# Patient Record
Sex: FEMALE | URBAN - METROPOLITAN AREA
[De-identification: names, ages, dates, MRNs, and addresses within clinical notes are randomized per-mention and may not be internally consistent; named-entity substitution may affect disease eponyms.]

---

## 2021-02-08 ENCOUNTER — EMERGENCY (EMERGENCY)
Facility: HOSPITAL | Age: 71
LOS: 1 days | Discharge: HOME | End: 2021-02-08
Attending: EMERGENCY MEDICINE
Payer: MEDICARE

## 2021-02-08 VITALS
SYSTOLIC BLOOD PRESSURE: 137 MMHG | RESPIRATION RATE: 20 BRPM | OXYGEN SATURATION: 99 % | WEIGHT: 231.93 LBS | DIASTOLIC BLOOD PRESSURE: 72 MMHG | HEIGHT: 67 IN | HEART RATE: 77 BPM | TEMPERATURE: 98 F

## 2021-02-08 VITALS
SYSTOLIC BLOOD PRESSURE: 137 MMHG | DIASTOLIC BLOOD PRESSURE: 82 MMHG | TEMPERATURE: 98 F | OXYGEN SATURATION: 97 % | RESPIRATION RATE: 19 BRPM | HEART RATE: 60 BPM

## 2021-02-08 DIAGNOSIS — I83.90 ASYMPTOMATIC VARICOSE VEINS OF UNSPECIFIED LOWER EXTREMITY: ICD-10-CM

## 2021-02-08 DIAGNOSIS — Z88.6 ALLERGY STATUS TO ANALGESIC AGENT: ICD-10-CM

## 2021-02-08 DIAGNOSIS — I10 ESSENTIAL (PRIMARY) HYPERTENSION: ICD-10-CM

## 2021-02-08 PROCEDURE — 99283 EMERGENCY DEPT VISIT LOW MDM: CPT

## 2021-02-08 NOTE — ED ADULT NURSE REASSESSMENT NOTE - NS ED NURSE REASSESS COMMENT FT1
pt verbalized feeling better , dressing and ace bandage applied as ordered , no bleeding noted pt to f/u with vascular for f/u care.

## 2021-02-08 NOTE — ED PROVIDER NOTE - PATIENT PORTAL LINK FT
You can access the FollowMyHealth Patient Portal offered by [facility] by registering at the following website: [email or web address]. By joining [employer]’s FollowMyHealth portal, you will also be able to view your health information using other applications (apps) compatible with our system.

## 2021-02-08 NOTE — ED PROVIDER NOTE - NSFOLLOWUPINSTRUCTIONS_ED_ALL_ED_FT
Keep ace wrapping in place for 24-48 hours.  Follow-up with your primary care doctor.  Also follow-up with vascular

## 2021-02-08 NOTE — ED PROVIDER NOTE - INTERNATIONAL TRAVEL
No
chowdary catheter successfully initiated draining pooja color urine, instructed pt on chowdary catheter care, pt demonstrated understanding via teach back/Alert and oriented to person, place and time

## 2021-02-08 NOTE — ED ADULT NURSE NOTE - OBJECTIVE STATEMENT
c/o bleeding L lower extremity near ankle. Patient has vernicose veins, was placing lotion and scratched herself, reports are started bleeding significantly, bleeding currently under control, dressing placed by EMS, denies pain, lightheadedness, and dizziness.

## 2021-02-08 NOTE — ED PROVIDER NOTE - OBJECTIVE STATEMENT
This patient is a 70 year old woman who presents to the ER c/o bleeding at her left foot.  Patient states that she was applying lotion to leg and a varicose vein began bleeding.  She denies pain.

## 2021-02-08 NOTE — ED PROVIDER NOTE - PROGRESS NOTE DETAILS
No bleeding while being observed.  When she was re-examined the area was palpated which resulted and oozing of blood at the site. Wound care done by me.  Surgicel applied and covered with gauze and ace wrap.

## 2021-02-08 NOTE — ED PROVIDER NOTE - CARE PROVIDER_API CALL
JOEL ALBRIGHT  Family Practice  [address] [place] [address]  Phone: [phone]  Fax: [phone]  Follow Up Time:    Marcos Salcido)  Surgery  [address], [address]  [place] [address]  Phone: [phone]  Fax: [phone]  Follow Up Time:

## 2021-02-08 NOTE — ED ADULT NURSE NOTE - NSFALLRSKASSESASSIST_ED_ALL_ED
Telephone Encounter by Zamzam Roque at 06/21/17 01:05 PM     Author:  Zamzam Roque Service:  (none) Author Type:  Patient      Filed:  06/21/17 01:06 PM Encounter Date:  6/21/2017 Status:  Signed     :  Zamzam Roque (Patient )            Patient returned call and would like a call back.[GC1.1M]      Revision History        User Key Date/Time User Provider Type Action    > GC1.1 06/21/17 01:06 PM Zamzam Roque Patient  Sign    M - Manual             no

## 2021-02-08 NOTE — ED ADULT TRIAGE NOTE - CHIEF COMPLAINT QUOTE
pt states she was putting lotion on her leg this am and the varicose franci on her left lower leg ruptured.

## 2021-02-08 NOTE — ED PROVIDER NOTE - CLINICAL SUMMARY MEDICAL DECISION MAKING FREE TEXT BOX
Patient with bleeding varicose vein bleeding initially stopped in the ER but recurred.  Surgicel and dressings applied.

## 2024-03-12 RX ORDER — TELMISARTAN 20 MG/1
1 TABLET ORAL
Qty: 0 | Refills: 0 | DISCHARGE

## 2024-03-12 RX ORDER — ASPIRIN/CALCIUM CARB/MAGNESIUM 324 MG
1 TABLET ORAL
Qty: 0 | Refills: 0 | DISCHARGE

## 2024-03-12 RX ORDER — PREGABALIN 225 MG/1
0 CAPSULE ORAL
Qty: 0 | Refills: 0 | DISCHARGE

## 2024-03-12 RX ORDER — ERGOCALCIFEROL 1.25 MG/1
1 CAPSULE ORAL
Qty: 0 | Refills: 0 | DISCHARGE